# Patient Record
Sex: MALE | Race: WHITE | NOT HISPANIC OR LATINO | Employment: FULL TIME | URBAN - METROPOLITAN AREA
[De-identification: names, ages, dates, MRNs, and addresses within clinical notes are randomized per-mention and may not be internally consistent; named-entity substitution may affect disease eponyms.]

---

## 2020-08-28 ENCOUNTER — CLINICAL SUPPORT (OUTPATIENT)
Dept: FAMILY MEDICINE | Facility: CLINIC | Age: 35
End: 2020-08-28

## 2020-08-28 VITALS
DIASTOLIC BLOOD PRESSURE: 95 MMHG | WEIGHT: 212 LBS | SYSTOLIC BLOOD PRESSURE: 146 MMHG | BODY MASS INDEX: 30.35 KG/M2 | HEIGHT: 70 IN | HEART RATE: 83 BPM | OXYGEN SATURATION: 97 % | TEMPERATURE: 97 F | RESPIRATION RATE: 14 BRPM

## 2020-08-28 DIAGNOSIS — Z02.1 PRE-EMPLOYMENT EXAMINATION: Primary | ICD-10-CM

## 2020-08-28 PROCEDURE — 99499 UNLISTED E&M SERVICE: CPT | Mod: S$GLB,,, | Performed by: FAMILY MEDICINE

## 2020-08-28 PROCEDURE — 99499 PR PHYSICAL - BASIC NON DOT/CDL: ICD-10-PCS | Mod: S$GLB,,, | Performed by: FAMILY MEDICINE

## 2020-08-28 NOTE — PROGRESS NOTES
"[facility] - Clinic Note    Subjective      Mr. Fields is a 35 y.o. 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  He has a history of hypertension that is well controlled.  He takes losartan 50 mg twice a day which controls his blood pressure.  Is a little bit elevated today because he has not taken his 2nd dose of blood pressure medication today.  Denies any chest pain, blurry vision, headaches.  He is otherwise feeling well.    PMH Norberto has no past medical history on file.   PSXH Norberto has no past surgical history on file.    Norberto's family history is not on file.   SH Norberto reports that he has quit smoking. His smokeless tobacco use includes snuff. No history on file for alcohol and drug.   ALG Norberto has no allergies on file.   MED Norberto currently has no medications in their medication list.     Review of Systems   Constitutional: Negative for chills and fever.   HENT: Negative for congestion and rhinorrhea.    Eyes: Negative for visual disturbance.   Respiratory: Negative for cough and shortness of breath.    Cardiovascular: Negative for chest pain.   Gastrointestinal: Negative for abdominal pain, constipation, diarrhea, nausea and vomiting.   Genitourinary: Negative for dysuria.   Musculoskeletal: Negative for myalgias.   Skin: Negative for rash.   Neurological: Negative for weakness and headaches.     Objective     BP (!) 146/95 (BP Location: Left arm, Patient Position: Sitting, BP Method: Large (Automatic))   Pulse 83   Temp 97.4 °F (36.3 °C) (Temporal)   Resp 14   Ht 5' 10" (1.778 m)   Wt 96.2 kg (212 lb)   SpO2 97%   BMI 30.42 kg/m²     Physical Exam  Vitals signs and nursing note reviewed.   Constitutional:       General: He is not in acute distress.     Appearance: Normal appearance. He is well-developed. He is not diaphoretic.   HENT:      Head: Normocephalic and atraumatic.      Right Ear: " External ear normal.      Left Ear: External ear normal.   Eyes:      General:         Right eye: No discharge.         Left eye: No discharge.   Cardiovascular:      Rate and Rhythm: Normal rate and regular rhythm.      Heart sounds: Normal heart sounds.   Pulmonary:      Effort: Pulmonary effort is normal.      Breath sounds: Normal breath sounds. No wheezing or rales.   Skin:     General: Skin is warm and dry.   Neurological:      Mental Status: He is alert and oriented to person, place, and time. Mental status is at baseline.      Motor: No weakness.   Psychiatric:         Mood and Affect: Mood normal.         Behavior: Behavior normal.         Thought Content: Thought content normal.         Judgment: Judgment normal.        Assessment/Plan     1. Pre-employment examination       I see no medical reason that patient should not participate in his job.  Needs to make sure to take his blood pressure medicine to maintain a normal blood pressure.  Urine drug screen today.  Make sure to keep follow-up with PCP.    No future appointments.      Avery Rojo MD  Family Medicine  [facility]

## 2021-12-22 ENCOUNTER — OFFICE VISIT (OUTPATIENT)
Dept: ORTHOPEDICS | Facility: CLINIC | Age: 36
End: 2021-12-22
Payer: COMMERCIAL

## 2021-12-22 ENCOUNTER — HOSPITAL ENCOUNTER (OUTPATIENT)
Dept: RADIOLOGY | Facility: HOSPITAL | Age: 36
Discharge: HOME | End: 2021-12-22
Attending: ORTHOPAEDIC SURGERY
Payer: COMMERCIAL

## 2021-12-22 VITALS — WEIGHT: 212 LBS | HEIGHT: 70 IN | RESPIRATION RATE: 18 BRPM | BODY MASS INDEX: 30.35 KG/M2

## 2021-12-22 DIAGNOSIS — M25.562 ACUTE PAIN OF LEFT KNEE: ICD-10-CM

## 2021-12-22 DIAGNOSIS — M25.562 ACUTE PAIN OF LEFT KNEE: Primary | ICD-10-CM

## 2021-12-22 PROCEDURE — 73564 XR KNEE ORTHO LEFT WITH FLEXION: ICD-10-PCS | Mod: 26,LT,, | Performed by: RADIOLOGY

## 2021-12-22 PROCEDURE — 1159F MED LIST DOCD IN RCRD: CPT | Mod: CPTII,S$GLB,, | Performed by: ORTHOPAEDIC SURGERY

## 2021-12-22 PROCEDURE — 73562 X-RAY EXAM OF KNEE 3: CPT | Mod: 26,RT,, | Performed by: RADIOLOGY

## 2021-12-22 PROCEDURE — 73564 X-RAY EXAM KNEE 4 OR MORE: CPT | Mod: 26,LT,, | Performed by: RADIOLOGY

## 2021-12-22 PROCEDURE — 1160F PR REVIEW ALL MEDS BY PRESCRIBER/CLIN PHARMACIST DOCUMENTED: ICD-10-PCS | Mod: CPTII,S$GLB,, | Performed by: ORTHOPAEDIC SURGERY

## 2021-12-22 PROCEDURE — 99203 PR OFFICE/OUTPT VISIT, NEW, LEVL III, 30-44 MIN: ICD-10-PCS | Mod: S$GLB,,, | Performed by: ORTHOPAEDIC SURGERY

## 2021-12-22 PROCEDURE — 1160F RVW MEDS BY RX/DR IN RCRD: CPT | Mod: CPTII,S$GLB,, | Performed by: ORTHOPAEDIC SURGERY

## 2021-12-22 PROCEDURE — 1159F PR MEDICATION LIST DOCUMENTED IN MEDICAL RECORD: ICD-10-PCS | Mod: CPTII,S$GLB,, | Performed by: ORTHOPAEDIC SURGERY

## 2021-12-22 PROCEDURE — 99999 PR PBB SHADOW E&M-EST. PATIENT-LVL III: CPT | Mod: PBBFAC,,, | Performed by: ORTHOPAEDIC SURGERY

## 2021-12-22 PROCEDURE — 99203 OFFICE O/P NEW LOW 30 MIN: CPT | Mod: S$GLB,,, | Performed by: ORTHOPAEDIC SURGERY

## 2021-12-22 PROCEDURE — 3008F BODY MASS INDEX DOCD: CPT | Mod: CPTII,S$GLB,, | Performed by: ORTHOPAEDIC SURGERY

## 2021-12-22 PROCEDURE — 99999 PR PBB SHADOW E&M-EST. PATIENT-LVL III: ICD-10-PCS | Mod: PBBFAC,,, | Performed by: ORTHOPAEDIC SURGERY

## 2021-12-22 PROCEDURE — 3008F PR BODY MASS INDEX (BMI) DOCUMENTED: ICD-10-PCS | Mod: CPTII,S$GLB,, | Performed by: ORTHOPAEDIC SURGERY

## 2021-12-22 PROCEDURE — 73562 XR KNEE ORTHO LEFT WITH FLEXION: ICD-10-PCS | Mod: 26,RT,, | Performed by: RADIOLOGY

## 2021-12-22 PROCEDURE — 73564 X-RAY EXAM KNEE 4 OR MORE: CPT | Mod: TC,PN,LT

## 2023-08-22 ENCOUNTER — TELEPHONE (OUTPATIENT)
Dept: NEUROLOGY | Facility: CLINIC | Age: 38
End: 2023-08-22
Payer: OTHER GOVERNMENT

## 2023-08-22 NOTE — TELEPHONE ENCOUNTER
Pt reports he had MRI that shows an arachnoid cyst on the brain and noted partial empty sella. The pt reports a significant head injury in the past and feels this may be related in some way. I explained that our providers will need to review the imaging to determine the appropriate provider to be scheduled with. I provided the address and contact information, [facility].

## 2023-08-22 NOTE — TELEPHONE ENCOUNTER
----- Message from Lisseth Presley sent at 8/22/2023  3:23 PM CDT -----  Regarding: Needs Medical Advice  Contact: patient at [phone]  Type: Needs Medical Advice  Who Called:  patient at [phone]    Additional Information: Patient had a MRI and was told that he has a cyst on his head. Patient has tremors and brain fog. He would like to discuss his situation and schedule an appointment. Please call and advise. Thank you

## 2023-11-16 ENCOUNTER — PATIENT MESSAGE (OUTPATIENT)
Dept: RHEUMATOLOGY | Facility: CLINIC | Age: 38
End: 2023-11-16
Payer: OTHER GOVERNMENT

## 2023-12-07 ENCOUNTER — TELEPHONE (OUTPATIENT)
Dept: NEUROLOGY | Facility: CLINIC | Age: 38
End: 2023-12-07
Payer: OTHER GOVERNMENT

## 2023-12-07 NOTE — TELEPHONE ENCOUNTER
Spoke with the pt, reports he requested his referral and records be sent to our office. I advised that we have not yet received the information. I obtained the 's information ([facility] [phone] ext [phone] ask for community care). I reached out to [facility], placed on hold for 30 min, I was unable to hold any longer. I hung up, notified pt of situation, he reports he will call them again in the morning to request referral and records.

## 2023-12-07 NOTE — TELEPHONE ENCOUNTER
----- Message from John Henriquez, Patient Care Assistant sent at 12/7/2023  3:24 PM CST -----  Type: Needs Medical Advice    Who Called: Pt  Best Call Back Number: [phone]  Inquiry/Question: Pt is calling to get the status of a [facility]. Pt states he has been waiting since August no response. Please advise Thank you~

## 2024-01-18 ENCOUNTER — TELEPHONE (OUTPATIENT)
Dept: NEUROLOGY | Facility: CLINIC | Age: 39
End: 2024-01-18
Payer: OTHER GOVERNMENT

## 2024-01-18 NOTE — TELEPHONE ENCOUNTER
----- Message from Glenis Herzog sent at 1/18/2024 12:23 PM CST -----  Regarding: Referral  Good Afternoon    Dr. Das would like to refer the following patient to Dr. Staley in the Neurology department. The patients diagnosis is Tremor. I have scanned the patients referral and records into .       Thank you,   Glenis Cordero

## 2024-01-19 ENCOUNTER — TELEPHONE (OUTPATIENT)
Dept: NEUROLOGY | Facility: CLINIC | Age: 39
End: 2024-01-19
Payer: OTHER GOVERNMENT

## 2024-01-19 ENCOUNTER — OFFICE VISIT (OUTPATIENT)
Dept: NEUROLOGY | Facility: CLINIC | Age: 39
End: 2024-01-19
Payer: OTHER GOVERNMENT

## 2024-01-19 VITALS
SYSTOLIC BLOOD PRESSURE: 146 MMHG | BODY MASS INDEX: 35.42 KG/M2 | HEIGHT: 70 IN | DIASTOLIC BLOOD PRESSURE: 98 MMHG | WEIGHT: 247.38 LBS | HEART RATE: 97 BPM

## 2024-01-19 DIAGNOSIS — G93.0 ARACHNOID CYST: ICD-10-CM

## 2024-01-19 DIAGNOSIS — G25.0 ESSENTIAL TREMOR: Primary | ICD-10-CM

## 2024-01-19 DIAGNOSIS — E23.6 EMPTY SELLA: ICD-10-CM

## 2024-01-19 PROCEDURE — G2211 COMPLEX E/M VISIT ADD ON: HCPCS | Mod: S$PBB,,, | Performed by: PSYCHIATRY & NEUROLOGY

## 2024-01-19 PROCEDURE — 99214 OFFICE O/P EST MOD 30 MIN: CPT | Mod: PBBFAC,PO | Performed by: PSYCHIATRY & NEUROLOGY

## 2024-01-19 PROCEDURE — 99204 OFFICE O/P NEW MOD 45 MIN: CPT | Mod: S$PBB,,, | Performed by: PSYCHIATRY & NEUROLOGY

## 2024-01-19 PROCEDURE — 99999 PR PBB SHADOW E&M-EST. PATIENT-LVL IV: CPT | Mod: PBBFAC,,, | Performed by: PSYCHIATRY & NEUROLOGY

## 2024-01-19 RX ORDER — LOSARTAN POTASSIUM AND HYDROCHLOROTHIAZIDE 25; 100 MG/1; MG/1
1 TABLET ORAL DAILY
COMMUNITY
Start: 2023-10-02

## 2024-01-19 RX ORDER — AZELASTINE 1 MG/ML
2 SPRAY, METERED NASAL
COMMUNITY
Start: 2023-08-29

## 2024-01-19 RX ORDER — PROPRANOLOL HYDROCHLORIDE 20 MG/1
20 TABLET ORAL 2 TIMES DAILY
COMMUNITY
Start: 2023-10-11

## 2024-01-19 RX ORDER — FLUTICASONE PROPIONATE 50 MCG
2 SPRAY, SUSPENSION (ML) NASAL
COMMUNITY
Start: 2023-06-23

## 2024-01-19 NOTE — PROGRESS NOTES
Date: 1/19/2024    Patient ID: Norberto Fields is a 38 y.o. male.    Referring Provider:  [facility]    Chief Complaint: Tremors      History of Present Illness:  Mr. Fields is a 38 y.o. male who presents referred by [facility] today for evaluation of tremor.     He notes tremor for many years. He would note it mainly when he was stressed or hungry when he was younger. In about 2010, he started to have more prominent tremor In 2017/2018 he has had even more difficulty with the tremor. In 2020 he started to have trouble doing is job (working as a  and trouble unlocking handcuffs, target shooting). The tremor has affected writing, texting, eating, drinking, etc. Right hand is worse but both are significantly affected. No voice or head tremor.     He was evaluated and had EKG about a year ago. He was prescribed propranolol but hasn't started it yet.     He has a MRI brain scan that showed an [id number] small arachoid cyst in the left cerebellum (June 2023). He has sinus disease. He has a lot of sinus headaches.     He had a partial empty sella on MRI too. He has no vision loss. He will hear whooshing sound in his ears if he bends over.     A maternal great aunt had a tremor. Mother doesn't have a tremor.     He takes losartan/HCTZ for HTN.     Allergies:  Review of patient's allergies indicates:  No Known Allergies    Current Medications:  Current Outpatient Medications   Medication Sig Dispense Refill    azelastine (ASTELIN) 137 mcg (0.1 %) nasal spray 2 sprays by Nasal route as needed.      fluticasone propionate (FLONASE) 50 mcg/actuation nasal spray 2 sprays by Nasal route as needed.      losartan-hydrochlorothiazide 100-25 mg (HYZAAR) 100-25 mg per tablet Take 1 tablet by mouth once daily.      propranoloL (INDERAL) 20 MG tablet Take 20 mg by mouth 2 (two) times daily.       No current facility-administered medications for this visit.       Past Medical History:  Past Medical  "History:   Diagnosis Date    Hypertension     Neuropathy        Past Surgical History:  History reviewed. No pertinent surgical history.    Family History:  family history includes Cancer in his father; Diabetes in his father; Heart disease in his father; Hyperlipidemia in his father and mother; Migraines in his mother; Stroke in his maternal grandmother.    Social History:   reports that he has never smoked. His smokeless tobacco use includes snuff. He reports that he does not currently use alcohol. He reports that he does not use drugs.    Physical Exam:  Vitals:    01/19/24 0929   BP: (!) 146/98   Pulse: 97   Weight: 112.2 kg (247 lb 5.7 oz)   Height: 5' 10" (1.778 m)   PainSc:   4   PainLoc: Generalized     Body mass index is 35.49 kg/m².    Neurological Exam:  Mental status: Awake, alert.  Speech/Language: No dysarthria or aphasia on conversation.   Cranial nerves: Pupils equal round and reactive to light, extraocular movements intact, facial strength and sensation intact bilaterally, tongue midline, hearing grossly intact bilaterally. Shoulder shrug normal bilaterally.   Motor: 5 out of 5 strength throughout the upper and lower extremities bilaterally. Normal bulk and tone.   Sensation: Intact to light touch and vibration bilaterally.  DTR: 2+ at the knees and biceps bilaterally.  Coordination: Finger-nose-finger testing and rapid alternating movements normal bilaterally. BUE action and postural tremor.           Data:  I have personally reviewed the referring provider's notes, labs, & imaging made available to me today.     Labs:      Imaging:  MRI report in referral shows tiny cerebellar arachnoid cyst without mass effect and partially empty sella.     Assessment and Plan:  Mr. Fields is a 38 y.o. male referred to me by [facility] for evaluation of tremor. His tremor is consistent with essential tremor. No parkinsonian features on exam. Will check EKG before advising on starting propranolol. " Discussed disease course and other available treatment options. Will notify him after EKG about how to start propranolol or starting alternative.     Reassured him of the benign nature of arachnoid cysts and the report does not sound concerning but will get MRI images to look at myself.     For the partially empty sella, advised he see optho and if any optic nerve swelling, would need to see HA or neuro-optho for further workup and management. Counseled on signs of IIH and what to look for. Also could discuss with PCP for pituitary workup and/or endocrine referral for that.     Visit today is associated with current or anticipated ongoing medical care related to this patient's single serious condition/complex condition (essential tremor). Plan to followup in 6 months for ongoing management.     Essential tremor  -     EKG 12-lead; Future    Arachnoid cyst    Empty sella       I spent a total of 45 minutes on the day of the visit.This includes face to face time and non-face to face time preparing to see the patient (eg, review of tests), Obtaining and/or reviewing separately obtained history, Documenting clinical information in the electronic or other health record, Independently interpreting results and communicating results to the patient/family/caregiver, or Care coordination.

## 2024-01-19 NOTE — PATIENT INSTRUCTIONS
The arachnoid cyst is small and not the cause for the tremor. This was likely present since birth and is not causing issues. We will request the images so I can look at it myself but from the report    The empty sella is often nothing worrisome but can sometimes be an indicator of something called idiopathic intracranial hypertension (IIH). Symptoms of this include headaches, whooshing sound in the ear, and vision changes. An ophthalmologist should look for any optic nerve swelling. If you have that or need further workup this is treated by a headache specialist or neuro-ophthalmologist.     The partially empty sella also can have something to do with pituitary dysfunction so primary care could refer you to endocrinology to workup pituitary function or do a dedicated pituitary MRI potentially.

## 2024-01-19 NOTE — TELEPHONE ENCOUNTER
----- Message from April Webb sent at 1/19/2024  8:34 AM CST -----     Type: Need Medical Advice   Who Called:Patient   Best callback number: [phone]  Additional Information: Patient is in route he stated he will be about 10mins late   Please call to further assist, Thanks.

## 2024-01-23 ENCOUNTER — HOSPITAL ENCOUNTER (OUTPATIENT)
Dept: CARDIOLOGY | Facility: HOSPITAL | Age: 39
Discharge: HOME | End: 2024-01-23
Attending: PSYCHIATRY & NEUROLOGY
Payer: OTHER GOVERNMENT

## 2024-01-23 DIAGNOSIS — G25.0 ESSENTIAL TREMOR: ICD-10-CM

## 2024-01-23 PROCEDURE — 93005 ELECTROCARDIOGRAM TRACING: CPT

## 2024-01-23 PROCEDURE — 93010 ELECTROCARDIOGRAM REPORT: CPT | Mod: ,,, | Performed by: INTERNAL MEDICINE

## 2024-01-24 ENCOUNTER — TELEPHONE (OUTPATIENT)
Dept: NEUROLOGY | Facility: CLINIC | Age: 39
End: 2024-01-24
Payer: OTHER GOVERNMENT

## 2024-01-24 NOTE — TELEPHONE ENCOUNTER
----- Message from Tatiana Staley MD sent at 1/24/2024  4:10 PM CST -----  EKG looks OK to start propranolol.